# Patient Record
Sex: MALE | Race: WHITE | Employment: OTHER | URBAN - METROPOLITAN AREA
[De-identification: names, ages, dates, MRNs, and addresses within clinical notes are randomized per-mention and may not be internally consistent; named-entity substitution may affect disease eponyms.]

---

## 2017-11-16 ENCOUNTER — PROCEDURE VISIT (OUTPATIENT)
Dept: NEUROLOGY | Age: 72
End: 2017-11-16

## 2017-11-16 DIAGNOSIS — M79.604 BILATERAL LEG PAIN: Primary | ICD-10-CM

## 2017-11-16 DIAGNOSIS — M79.605 BILATERAL LEG PAIN: Primary | ICD-10-CM

## 2017-11-16 DIAGNOSIS — G60.9 IDIOPATHIC PERIPHERAL NEUROPATHY: ICD-10-CM

## 2017-11-16 PROCEDURE — 95910 NRV CNDJ TEST 7-8 STUDIES: CPT | Performed by: PSYCHIATRY & NEUROLOGY

## 2017-11-16 PROCEDURE — 95886 MUSC TEST DONE W/N TEST COMP: CPT | Performed by: PSYCHIATRY & NEUROLOGY

## 2017-11-16 NOTE — PROGRESS NOTES
Camelia Siddiqui M.D. [phone]  [facility]  Board Certified in [address], [address] [facility], [address]    EMG / NERVE CONDUCTION STUDY    PATIENT:     Alexandr Brady      DATE OF EM2017    YOB: 1945       REASON FOR EMG:   Low back pain, bilateral  leg pain, right worse than left, rule out radiculopathy     REFERRING PHYSICIAN:  Binnie Sandhoff, MD   [place]    SUMMARY:  The right peroneal motor had a low amplitude and slow conduction velocity. The left peroneal motor had normal amplitude and slow conduction velocity. Bilateral posterior tibial motor nerve studies had normal amplitudes and slow conduction velocities. Bilateral superficial peroneal sensory studies and the right sural sensory were not recordable. Needle EMG of several muscles in both lower extremities and the lumbar paraspinal muscles were normal.     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 There is no electrophysiological evidence for acute lumbar radiculopathy at this time. _____________________________  Cameila Siddiqui M.D.   Electromyographer/Neurologist

## 2021-04-12 ENCOUNTER — HOSPITAL ENCOUNTER (OUTPATIENT)
Dept: CT IMAGING | Age: 76
Discharge: HOME | End: 2021-04-12
Payer: MEDICARE

## 2021-04-12 DIAGNOSIS — R31.0 GROSS HEMATURIA: ICD-10-CM

## 2021-04-12 LAB
GFR AFRICAN AMERICAN: >60
GFR NON-AFRICAN AMERICAN: 59
PERFORMED ON: ABNORMAL
POC CREATININE: 1.2 MG/DL (ref 0.8–1.3)
POC SAMPLE TYPE: ABNORMAL

## 2021-04-12 PROCEDURE — 74178 CT ABD&PLV WO CNTR FLWD CNTR: CPT

## 2021-04-12 PROCEDURE — 6360000004 HC RX CONTRAST MEDICATION: Performed by: UROLOGY

## 2021-04-12 PROCEDURE — 82565 ASSAY OF CREATININE: CPT

## 2021-04-12 RX ADMIN — IOPAMIDOL 75 ML: 755 INJECTION, SOLUTION INTRAVENOUS at 07:55
